# Patient Record
Sex: FEMALE | Race: OTHER | HISPANIC OR LATINO
[De-identification: names, ages, dates, MRNs, and addresses within clinical notes are randomized per-mention and may not be internally consistent; named-entity substitution may affect disease eponyms.]

---

## 2019-04-10 ENCOUNTER — APPOINTMENT (OUTPATIENT)
Dept: PLASTIC SURGERY | Facility: CLINIC | Age: 54
End: 2019-04-10

## 2019-04-10 ENCOUNTER — OUTPATIENT (OUTPATIENT)
Dept: OUTPATIENT SERVICES | Facility: HOSPITAL | Age: 54
LOS: 1 days | End: 2019-04-10

## 2019-04-10 VITALS
HEIGHT: 66 IN | WEIGHT: 180 LBS | BODY MASS INDEX: 28.93 KG/M2 | TEMPERATURE: 97.8 F | RESPIRATION RATE: 15 BRPM | DIASTOLIC BLOOD PRESSURE: 71 MMHG | SYSTOLIC BLOOD PRESSURE: 132 MMHG | HEART RATE: 91 BPM

## 2019-04-10 DIAGNOSIS — Z78.9 OTHER SPECIFIED HEALTH STATUS: ICD-10-CM

## 2019-04-10 DIAGNOSIS — E09.43 DRUG OR CHEMICAL INDUCED DIABETES MELLITUS WITH NEUROLOGICAL COMPLICATIONS WITH DIABETIC AUTONOMIC (POLY)NEUROPATHY: ICD-10-CM

## 2019-04-10 DIAGNOSIS — E11.9 TYPE 2 DIABETES MELLITUS W/OUT COMPLICATIONS: ICD-10-CM

## 2019-04-10 DIAGNOSIS — Z86.79 PERSONAL HISTORY OF OTHER DISEASES OF THE CIRCULATORY SYSTEM: ICD-10-CM

## 2019-04-10 DIAGNOSIS — Z00.00 ENCOUNTER FOR GENERAL ADULT MEDICAL EXAMINATION W/OUT ABNORMAL FINDINGS: ICD-10-CM

## 2019-04-10 RX ORDER — ATORVASTATIN CALCIUM 10 MG/1
10 TABLET, FILM COATED ORAL
Refills: 0 | Status: ACTIVE | COMMUNITY

## 2019-04-10 RX ORDER — TRAMADOL HYDROCHLORIDE 25 MG/1
TABLET, COATED ORAL
Refills: 0 | Status: ACTIVE | COMMUNITY

## 2019-04-10 RX ORDER — DULAGLUTIDE 1.5 MG/.5ML
1.5 INJECTION, SOLUTION SUBCUTANEOUS
Refills: 0 | Status: ACTIVE | COMMUNITY

## 2019-04-10 RX ORDER — LISINOPRIL 30 MG/1
TABLET ORAL
Refills: 0 | Status: ACTIVE | COMMUNITY

## 2019-04-10 RX ORDER — METFORMIN HYDROCHLORIDE 625 MG/1
TABLET ORAL
Refills: 0 | Status: ACTIVE | COMMUNITY

## 2019-04-10 NOTE — HISTORY OF PRESENT ILLNESS
[FreeTextEntry1] : 53 yr here for abdominoplasty and lipo consult with BR\par she has a bmi of 29\par she has had a br in 1980 in , but her breasts apparently grew. last mammo last yr no abnormalities, no history of breast cancer\par \par pmhx: DM 2, back pain\par psxh: BR in 1980, ovarian cyst 1984, 2014 knee, 2015 right wrist, 2017 lower back\par meds: metformin, insulin, tramadol, atorvastatin\par allergies: nkda\par fh: breast cancer\par social hx: denies tobacco, alcohol, illicit drugs

## 2019-04-10 NOTE — ASSESSMENT
[FreeTextEntry1] : 53 yr F here for abdominoplasty and lipo with BR\par \par - explained she may need a romy de justin and the scar pattern, rba dw patient including bleeding, infection,scarring, pain, damage to underlying structures, need for future surgery, dvt/pe leading to death, hematoma, seroma. she agreed to proceed\par - with BR, i explained that since it is a redo, and I do not have a way of knowing the previous pedicle pattern, she is at risk for nipple loss and necrosis. also explained that she may need free nipple grafts, which would lead to no sensation at all  on her nipples(she already has near to none currently), pigment loss, nipple loss or necrosis. other risks of bleeding, infection, scarring, pain, damage to underlying structures, need for future surgery, wound breakdown, necrosis, nipple loss and necrosis, loss of sensation to nipples explained. she agreed to proceed\par -her last A1c was around 8 , which was high. this also places her at risk of post operative complications

## 2019-04-10 NOTE — PHYSICAL EXAM
[de-identified] : Right breast: SN-N 30cm, n-imf 13.5cm, base 20.5cm\par left breast SN-N 33.5cm, n-imf 13cm, base 20cm\par \par anchor type incisions bilaterally\par poor nipple sensation bilaterally [de-identified] : no hernias\par diastasis mild\par she has a double abdominal deformity with a second crease at the level of the umbilicus

## 2019-04-11 DIAGNOSIS — Z01.89 ENCOUNTER FOR OTHER SPECIFIED SPECIAL EXAMINATIONS: ICD-10-CM

## 2019-09-10 ENCOUNTER — APPOINTMENT (OUTPATIENT)
Dept: PLASTIC SURGERY | Facility: CLINIC | Age: 54
End: 2019-09-10

## 2019-09-10 ENCOUNTER — OUTPATIENT (OUTPATIENT)
Dept: OUTPATIENT SERVICES | Facility: HOSPITAL | Age: 54
LOS: 1 days | End: 2019-09-10

## 2019-09-10 VITALS — WEIGHT: 189 LBS | BODY MASS INDEX: 30.51 KG/M2

## 2019-09-10 DIAGNOSIS — Z86.39 PERSONAL HISTORY OF OTHER ENDOCRINE, NUTRITIONAL AND METABOLIC DISEASE: ICD-10-CM

## 2019-09-10 NOTE — PHYSICAL EXAM
[Bra Size: _______] : Bra Size: [unfilled] [de-identified] : No acute distress [de-identified] : Large, pendulous breasts.  Moderately deflated, with significant skin redundancy.  Grade 2 ptosis, with a moderate amount of skin and breast tissue below the IMFs bilaterally.  Previous breast reduction scars present (periareolar, vertical, IMF).  Minimal sensation in both NACs.  No nipple discharge noted.  The NACs are slightly irregular in shape.  The left nipple is 4cm lower than the right.  No intertrigo present at the IMFs  Qgkwb-at-hdviyr distance 34cm on the left, 30cm on the right.  Gbctwi-rj-WLZ 15cm on the left, 15cm on the right.  Breast width 17cm on the left, 17cm on the right.  [de-identified] : Nonlabored respirations, equal expansion bilaterally [de-identified] : Significant redundancy of skin and subcutaneous fat present.  Line of adhesion near the level of the umbilicus, creating a fold at the midabdomen.  Minimal enlargement of the mons region.  No intertrigo present.  Moderate intraabdominal volume, creating a protuberant abdomen.  Moderate flank fat/skin excess.

## 2019-09-10 NOTE — ASSESSMENT
[FreeTextEntry1] : Patient is a 52 yo female, presenting for consultation regarding an abdominoplasty and breast reduction

## 2019-09-10 NOTE — HISTORY OF PRESENT ILLNESS
[FreeTextEntry1] : Patient is a 52 yo female, who presents for evaluation for a breast reduction and abdominoplasty.  Patient has a history of a previous breast reduction (at 13 years old).  Patient does not have any records of the procedure.  She reports her breasts have not changed dramatically in size, or shape in the last few years.  Patient currently wears a DD bra.  She has chronic back pain, but does not have significant neck pain.  She has shoulder grooving from her bra straps.  Patient does not have any history of intertrigo under the breasts.  Her mother had breast cancer in her 80's.  No other significant family history of breast cancer.  Her last mammogram was this summer, which did not show concerns for cancer.  Regarding the abdomen, patient  does not report any history of intertrigo.  She does have a low abdominal scar from an ovarian procedure.  Patient's weight has been stable recently.  She does not plan to lose significant weight in the near future.  She reports she has never been pregnant.  Patient is an insulin dependent diabetic.  She follows with an endocrinologist.  Her last HbA1c was 8, which is down from 12.

## 2019-09-11 DIAGNOSIS — Z01.812 ENCOUNTER FOR PREPROCEDURAL LABORATORY EXAMINATION: ICD-10-CM

## 2019-10-29 ENCOUNTER — CLINICAL ADVICE (OUTPATIENT)
Age: 54
End: 2019-10-29

## 2019-10-29 ENCOUNTER — OUTPATIENT (OUTPATIENT)
Dept: OUTPATIENT SERVICES | Facility: HOSPITAL | Age: 54
LOS: 1 days | End: 2019-10-29

## 2019-10-29 ENCOUNTER — APPOINTMENT (OUTPATIENT)
Dept: PLASTIC SURGERY | Facility: CLINIC | Age: 54
End: 2019-10-29

## 2019-10-29 NOTE — PHYSICAL EXAM
[NI] : Normal [de-identified] : Nonlabored respirations, equal expansion bilaterally [de-identified] : Large, pendulous breasts. Moderately deflated, with significant skin redundancy. Grade 2 ptosis, with a moderate amount of skin and breast tissue below the IMFs bilaterally. Previous breast reduction scars present (periareolar, vertical, IMF). Minimal sensation in both NACs. No nipple discharge noted. The NACs are slightly irregular in shape. The left nipple is 4cm lower than the right. No intertrigo present at the IMFs Wstht-er-ciquob distance 34cm on the left, 30cm on the right. Wkbtfi-yg-VIY 15cm on the left, 15cm on the right. Breast width 17cm on the left, 17cm on the right.  [de-identified] : No acute traumatic deformity  [de-identified] : Appropriate mood, and affect

## 2019-10-29 NOTE — HISTORY OF PRESENT ILLNESS
[FreeTextEntry1] : Patient is a 52 yo female, who presents for re-evaluation for a breast reduction. Patient has a history of a previous breast reduction (at 13 years old). Patient does not have any records of the procedure. She reports her breasts have not changed dramatically in size, or shape in the last few years. Patient currently wears a DD bra. She has chronic back pain, but does not have significant neck pain. She has shoulder grooving from her bra straps. Patient does not have any history of intertrigo under the breasts. Her mother had breast cancer in her 80's. No other significant family history of breast cancer. Her last mammogram was this summer, which did not show concerns for cancer. She does not plan to lose significant weight in the near future. She reports she has never been pregnant. Patient is an insulin dependent diabetic. She was recently seen by her PCP and Endocrinologist in preparation for surgery. \par

## 2019-10-30 DIAGNOSIS — Z01.812 ENCOUNTER FOR PREPROCEDURAL LABORATORY EXAMINATION: ICD-10-CM

## 2019-11-15 ENCOUNTER — RESULT REVIEW (OUTPATIENT)
Age: 54
End: 2019-11-15

## 2019-11-15 ENCOUNTER — OUTPATIENT (OUTPATIENT)
Dept: OUTPATIENT SERVICES | Facility: HOSPITAL | Age: 54
LOS: 1 days | Discharge: HOME | End: 2019-11-15

## 2019-11-15 LAB — GLUCOSE BLDC GLUCOMTR-MCNC: 102 MG/DL — HIGH (ref 70–99)

## 2019-11-15 RX ORDER — TRAMADOL HYDROCHLORIDE 50 MG/1
1 TABLET ORAL
Qty: 15 | Refills: 0
Start: 2019-11-15

## 2019-11-16 RX ORDER — TRAMADOL HYDROCHLORIDE 50 MG/1
1 TABLET ORAL
Qty: 15 | Refills: 0
Start: 2019-11-16

## 2019-11-21 ENCOUNTER — OUTPATIENT (OUTPATIENT)
Dept: OUTPATIENT SERVICES | Facility: HOSPITAL | Age: 54
LOS: 1 days | End: 2019-11-21

## 2019-11-21 ENCOUNTER — APPOINTMENT (OUTPATIENT)
Dept: PLASTIC SURGERY | Facility: CLINIC | Age: 54
End: 2019-11-21

## 2019-11-21 VITALS
DIASTOLIC BLOOD PRESSURE: 65 MMHG | HEART RATE: 99 BPM | SYSTOLIC BLOOD PRESSURE: 105 MMHG | RESPIRATION RATE: 15 BRPM | TEMPERATURE: 99 F

## 2019-11-21 LAB — SURGICAL PATHOLOGY STUDY: SIGNIFICANT CHANGE UP

## 2019-11-22 DIAGNOSIS — Z09 ENCOUNTER FOR FOLLOW-UP EXAMINATION AFTER COMPLETED TREATMENT FOR CONDITIONS OTHER THAN MALIGNANT NEOPLASM: ICD-10-CM

## 2019-12-10 ENCOUNTER — APPOINTMENT (OUTPATIENT)
Dept: PLASTIC SURGERY | Facility: CLINIC | Age: 54
End: 2019-12-10

## 2019-12-10 ENCOUNTER — OUTPATIENT (OUTPATIENT)
Dept: OUTPATIENT SERVICES | Facility: HOSPITAL | Age: 54
LOS: 1 days | End: 2019-12-10

## 2019-12-12 DIAGNOSIS — Z09 ENCOUNTER FOR FOLLOW-UP EXAMINATION AFTER COMPLETED TREATMENT FOR CONDITIONS OTHER THAN MALIGNANT NEOPLASM: ICD-10-CM

## 2019-12-14 NOTE — HISTORY OF PRESENT ILLNESS
[FreeTextEntry1] : Patient is a 53 yo female s/p bilateral breast reduction.  She has been doing well.

## 2019-12-14 NOTE — PHYSICAL EXAM
[NI] : Normal [de-identified] : Non-labored respirations, equal expansion bilaterally [de-identified] : Breasts with adequate symmetry.  Incisions appear to be healing well.  NACs appear viable.  Ecchymosis is improving.  No fluid collections appreciated.  [de-identified] : Significant redundancy of abdominal skin, and fat.  Panniculus overhangs the pubis.  Transverse zone of adherence at the level of the umbilicus, causing the upper abdominal skin/fat to overhang.  No umbilical hernia readily appreciated on palpation.  Protuberance of the abdomen largely caused by intra-abdominal volume.  Low, transverse suprapubic incision, well healed.

## 2019-12-27 ENCOUNTER — OUTPATIENT (OUTPATIENT)
Dept: OUTPATIENT SERVICES | Facility: HOSPITAL | Age: 54
LOS: 1 days | End: 2019-12-27

## 2019-12-27 ENCOUNTER — APPOINTMENT (OUTPATIENT)
Dept: PLASTIC SURGERY | Facility: CLINIC | Age: 54
End: 2019-12-27

## 2019-12-27 NOTE — PHYSICAL EXAM
[de-identified] : No acute distress [de-identified] : Bilateral breasts with well healing incisions.  Left breast with mild erythema along the lower pole.  No fluid collection appreciated.  Small area of induration at the central aspect of the vertical incision on the left breast.  All tissue appears viable. [de-identified] : Non-distended [de-identified] : No acute traumatic deformity [de-identified] : Nonlabored respirations, equal expansion bilaterally [de-identified] : Appropriate mood/behavior

## 2019-12-27 NOTE — HISTORY OF PRESENT ILLNESS
[FreeTextEntry1] : Patient is a 55 yo female, s/p bilateral secondary breast reduction.  She noticed erythema developing on her left breast, along the lower pole.  There is some discomfort in the area, but no significant pain.  She denies fever or chills, and otherwise feels well.  She presents today for evaluation.

## 2019-12-30 DIAGNOSIS — Z09 ENCOUNTER FOR FOLLOW-UP EXAMINATION AFTER COMPLETED TREATMENT FOR CONDITIONS OTHER THAN MALIGNANT NEOPLASM: ICD-10-CM

## 2020-02-11 ENCOUNTER — OUTPATIENT (OUTPATIENT)
Dept: OUTPATIENT SERVICES | Facility: HOSPITAL | Age: 55
LOS: 1 days | End: 2020-02-11

## 2020-02-11 ENCOUNTER — APPOINTMENT (OUTPATIENT)
Dept: PLASTIC SURGERY | Facility: CLINIC | Age: 55
End: 2020-02-11

## 2020-02-11 DIAGNOSIS — E65 LOCALIZED ADIPOSITY: ICD-10-CM

## 2020-02-11 NOTE — ASSESSMENT
[FreeTextEntry1] : Patient is a 53 yo female, s/p bilateral breast reduction, presenting for pre-op evaluation for an abdominoplasty, flank liposuction and lateral breast/axillary liposuction

## 2020-02-11 NOTE — HISTORY OF PRESENT ILLNESS
[FreeTextEntry1] : Patient is a 55 yo female, presenting for evaluation for an panniculectomy/abdominoplasty.  Patient underwent a bilateral breast reduction in November of 2019, which was complicated by a localized cellulitis on the left breast, which resolved with incision and drainage along the central aspect of the vertical incision, and a course of antibiotics.  Patient has healed well from her breast reduction.  She is happy with her result, and reports relief of macromastia symptoms, as well as improved sensation in her nipples.  She presents today for pre-op evaluation for a panniculectomy/abdominoplasty.  Patient does not report any changes in her health since her last visit.

## 2020-02-11 NOTE — PHYSICAL EXAM
[de-identified] : Alert, oriented [de-identified] : Non-labored respirations, equal expansion bilaterally [de-identified] : Bilateral breasts with well healed reduction scars.  Breasts are soft.  No fluid collections appreciated.  Medial aspect of left breast with mild pigmentation/discoloration in area of significant post-op ecchymosis.  Small dog-ear on the medial aspect of the IMF incision on the right.  Moderate redundancy of fat in the lateral aspect of the breasts/axillas.   [de-identified] : Significant redundancy of skin and subcutaneous fat present.  Line of adhesion near the level of the umbilicus, creating a fold at the midabdomen.  Minimal enlargement of the mons region.  No intertrigo present.  Moderate intraabdominal volume, creating a protuberant abdomen.  Moderate flank fat/skin excess. \par  [de-identified] : No acute traumatic deformity [de-identified] : Appropriate mood/behavior

## 2020-02-13 DIAGNOSIS — Z01.812 ENCOUNTER FOR PREPROCEDURAL LABORATORY EXAMINATION: ICD-10-CM

## 2020-03-03 ENCOUNTER — TRANSCRIPTION ENCOUNTER (OUTPATIENT)
Age: 55
End: 2020-03-03

## 2020-03-03 PROBLEM — E65 ABDOMINAL PANNICULUS: Status: ACTIVE | Noted: 2020-03-03

## 2020-03-03 RX ORDER — OXYCODONE AND ACETAMINOPHEN 5; 325 MG/1; MG/1
5-325 TABLET ORAL
Qty: 15 | Refills: 0 | Status: ACTIVE | COMMUNITY
Start: 2020-03-03 | End: 1900-01-01

## 2020-03-04 ENCOUNTER — OUTPATIENT (OUTPATIENT)
Dept: OUTPATIENT SERVICES | Facility: HOSPITAL | Age: 55
LOS: 1 days | Discharge: HOME | End: 2020-03-04

## 2020-03-04 RX ORDER — HYDROMORPHONE HYDROCHLORIDE 2 MG/1
2 TABLET ORAL
Qty: 10 | Refills: 0 | Status: ACTIVE | COMMUNITY
Start: 2020-03-04 | End: 1900-01-01

## 2020-03-04 RX ORDER — DIAZEPAM 5 MG/1
5 TABLET ORAL 3 TIMES DAILY
Qty: 15 | Refills: 0 | Status: ACTIVE | COMMUNITY
Start: 2020-03-04 | End: 1900-01-01

## 2020-03-10 ENCOUNTER — OUTPATIENT (OUTPATIENT)
Dept: OUTPATIENT SERVICES | Facility: HOSPITAL | Age: 55
LOS: 1 days | End: 2020-03-10

## 2020-03-10 ENCOUNTER — APPOINTMENT (OUTPATIENT)
Dept: PLASTIC SURGERY | Facility: CLINIC | Age: 55
End: 2020-03-10

## 2020-03-10 VITALS
RESPIRATION RATE: 15 BRPM | SYSTOLIC BLOOD PRESSURE: 108 MMHG | HEART RATE: 103 BPM | DIASTOLIC BLOOD PRESSURE: 64 MMHG | TEMPERATURE: 98.5 F

## 2020-03-10 NOTE — HISTORY OF PRESENT ILLNESS
[FreeTextEntry1] : Patient is a 55 yo female, s/p abdominoplasty, bilateral lateral chest liposuction and right breast medial scar revision.  Patient has been doing okay at home.  She has used her Percocet for pain control.  She does not report any significant issues, other than pain.

## 2020-03-10 NOTE — PHYSICAL EXAM
[de-identified] : No acute distress [de-identified] : Nonlabored respirations, equal expansion bilaterally [de-identified] : Bilateral lateral chest walls with moderate ecchymosis, and edema [de-identified] : Prineo in place over right breast medial IMF incision [de-identified] : Abdominoplasty scar with Prineo in place.  No fluid collections appreciated.  Umbilicus appears viable.  Bilateral flanks with moderate ecchymosis.  Drains with appropriate serosanguineous drainage.   [de-identified] : Appropriate mood/behavior

## 2020-03-11 DIAGNOSIS — Z09 ENCOUNTER FOR FOLLOW-UP EXAMINATION AFTER COMPLETED TREATMENT FOR CONDITIONS OTHER THAN MALIGNANT NEOPLASM: ICD-10-CM

## 2020-03-24 ENCOUNTER — APPOINTMENT (OUTPATIENT)
Dept: PLASTIC SURGERY | Facility: CLINIC | Age: 55
End: 2020-03-24

## 2020-03-25 ENCOUNTER — OUTPATIENT (OUTPATIENT)
Dept: OUTPATIENT SERVICES | Facility: HOSPITAL | Age: 55
LOS: 1 days | End: 2020-03-25

## 2020-03-25 ENCOUNTER — APPOINTMENT (OUTPATIENT)
Dept: PLASTIC SURGERY | Facility: CLINIC | Age: 55
End: 2020-03-25

## 2020-03-25 VITALS — TEMPERATURE: 98.4 F

## 2020-03-25 VITALS — TEMPERATURE: 99 F

## 2020-03-25 DIAGNOSIS — Z09 ENCOUNTER FOR FOLLOW-UP EXAMINATION AFTER COMPLETED TREATMENT FOR CONDITIONS OTHER THAN MALIGNANT NEOPLASM: ICD-10-CM

## 2020-03-25 NOTE — PHYSICAL EXAM
[de-identified] : No acute distress [de-identified] : Nonlabored respirations, equal expansion bilaterally [de-identified] : Right medial IMF incision C/D/I, Prineo in place. [de-identified] : Abdominoplasty incision C/D/I, Prineo tape remains in place.  No fluid collection appreciated.  Drains with appropriate serosanguineous output. [de-identified] : No acute traumatic deformity [de-identified] : Appropriate mood/behavior

## 2020-03-25 NOTE — HISTORY OF PRESENT ILLNESS
[FreeTextEntry1] : Patient is a 55 yo female, s/p abdominoplasty, bilateral lateral chest liposuction and right medial IMF scar revision.  Patient has been doing well following surgery.  Both drains remain in place.  The right drain has minimal output at this time, and the left drain continues to have moderate output.

## 2020-04-01 ENCOUNTER — APPOINTMENT (OUTPATIENT)
Dept: PLASTIC SURGERY | Facility: CLINIC | Age: 55
End: 2020-04-01

## 2020-04-01 ENCOUNTER — OUTPATIENT (OUTPATIENT)
Dept: OUTPATIENT SERVICES | Facility: HOSPITAL | Age: 55
LOS: 1 days | End: 2020-04-01

## 2020-04-01 DIAGNOSIS — Z09 ENCOUNTER FOR FOLLOW-UP EXAMINATION AFTER COMPLETED TREATMENT FOR CONDITIONS OTHER THAN MALIGNANT NEOPLASM: ICD-10-CM

## 2020-04-02 NOTE — HISTORY OF PRESENT ILLNESS
[FreeTextEntry1] : Patient is a 53 yo female, s/p abdominoplasty.  Patient has been doing well.  The right drain was removed last week in clinic.  The left drain has been consistently showing outputs in the 20s for the last few days.  She does not report any other issues.

## 2023-04-20 NOTE — PHYSICAL EXAM
[de-identified] : No acute distress [de-identified] : Nonlabored respirations, equal expansion bilaterally [de-identified] : Incision is C/D/I.  Left drain with serous output.  Umbilicus appears viable.   [de-identified] : No acute traumatic deformity [de-identified] : Appropriate mood/behavior ambulatory